# Patient Record
Sex: MALE | Race: WHITE | NOT HISPANIC OR LATINO | Employment: FULL TIME | URBAN - METROPOLITAN AREA
[De-identification: names, ages, dates, MRNs, and addresses within clinical notes are randomized per-mention and may not be internally consistent; named-entity substitution may affect disease eponyms.]

---

## 2020-08-13 ENCOUNTER — OFFICE VISIT (OUTPATIENT)
Dept: NEUROSURGERY | Facility: CLINIC | Age: 40
End: 2020-08-13
Attending: PHYSICIAN ASSISTANT
Payer: COMMERCIAL

## 2020-08-13 VITALS
HEART RATE: 92 BPM | HEIGHT: 73 IN | DIASTOLIC BLOOD PRESSURE: 97 MMHG | SYSTOLIC BLOOD PRESSURE: 140 MMHG | OXYGEN SATURATION: 100 % | TEMPERATURE: 98.1 F | BODY MASS INDEX: 25.82 KG/M2 | WEIGHT: 194.8 LBS

## 2020-08-13 DIAGNOSIS — M54.50 CHRONIC BILATERAL LOW BACK PAIN, UNSPECIFIED WHETHER SCIATICA PRESENT: Primary | ICD-10-CM

## 2020-08-13 DIAGNOSIS — G89.29 CHRONIC BILATERAL LOW BACK PAIN, UNSPECIFIED WHETHER SCIATICA PRESENT: Primary | ICD-10-CM

## 2020-08-13 PROCEDURE — 99203 OFFICE O/P NEW LOW 30 MIN: CPT | Performed by: PHYSICIAN ASSISTANT

## 2020-08-13 PROCEDURE — G0463 HOSPITAL OUTPT CLINIC VISIT: HCPCS

## 2020-08-13 SDOH — HEALTH STABILITY: MENTAL HEALTH: HOW MANY STANDARD DRINKS CONTAINING ALCOHOL DO YOU HAVE ON A TYPICAL DAY?: 1 OR 2

## 2020-08-13 SDOH — HEALTH STABILITY: MENTAL HEALTH: HOW OFTEN DO YOU HAVE 6 OR MORE DRINKS ON ONE OCCASION?: LESS THAN MONTHLY

## 2020-08-13 SDOH — HEALTH STABILITY: MENTAL HEALTH: HOW OFTEN DO YOU HAVE A DRINK CONTAINING ALCOHOL?: 2-3 TIMES A WEEK

## 2020-08-13 ASSESSMENT — MIFFLIN-ST. JEOR: SCORE: 1847.49

## 2020-08-13 ASSESSMENT — PAIN SCALES - GENERAL: PAINLEVEL: MILD PAIN (3)

## 2020-08-13 NOTE — PROGRESS NOTES
Neurosurgery Consult    HPI    Mr. Reese is a 40-year-old male who is self-referred for evaluation of low back pain.  He states he had a history of low back pain for the past several years it is been waxing and waning it had improved initially after he got a standing desk at his work and then when he had to work from home he initially did not have a standing desk and his back pain worsened with increased sitting.  He did obtain a standing desk at home and his back pain got better.  Now he has a start of low level of chronic low backache.  He does report history of occasional flareups and spasm the last few days and generally go away on their own.  Denies any bowel or bladder symptoms, saddle anesthesia, numbness weakness or radicular symptoms into his lower extreme extremities.  He has never had any physical therapy formally, or injections.  He is over-the-counter medications for treatment.    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  Do not feel that he needs any imaging at this point as he has no red flag symptoms.  If his symptoms not improve after 4 to 6 weeks of physical therapy with concern begin with a lumbar x-ray and consider lumbar MRI after that depending on what we find.    Total time of 30 minutes spent with the patient today greater than 50% spent face to face in counseling and coordination of care.

## 2020-08-13 NOTE — NURSING NOTE
"Moshe Reese is a 40 year old male who presents for:  Chief Complaint   Patient presents with     Neurologic Problem     chronic low back Pain        Initial Vitals:  BP (!) 154/111 (BP Location: Right arm, Patient Position: Sitting, Cuff Size: Adult Regular)   Pulse 92   Temp 98.1  F (36.7  C) (Oral)   Ht 6' 1\" (1.854 m)   Wt 194 lb 12.8 oz (88.4 kg)   SpO2 100%   BMI 25.70 kg/m   Estimated body mass index is 25.7 kg/m  as calculated from the following:    Height as of this encounter: 6' 1\" (1.854 m).    Weight as of this encounter: 194 lb 12.8 oz (88.4 kg).. Body surface area is 2.13 meters squared. BP completed using cuff size: large  Mild Pain (3)    Nursing Comments: see chief complaint    Malick Fraire, [facility]    "

## 2020-08-13 NOTE — LETTER
8/13/2020         RE: Moshe Reese  [address] [facility] [place] [address]        Dear Colleague,    Thank you for referring your patient, Moshe Reese, to the [facility]. Please see a copy of my visit note below.    Neurosurgery Consult    HPI    Mr. Reese is a 40-year-old male who is self-referred for evaluation of low back pain.  He states he had a history of low back pain for the past several years it is been waxing and waning it had improved initially after he got a standing desk at his work and then when he had to work from home he initially did not have a standing desk and his back pain worsened with increased sitting.  He did obtain a standing desk at home and his back pain got better.  Now he has a start of low level of chronic low backache.  He does report history of occasional flareups and spasm the last few days and generally go away on their own.  Denies any bowel or bladder symptoms, saddle anesthesia, numbness weakness or radicular symptoms into his lower extreme extremities.  He has never had any physical therapy formally, or injections.  He is over-the-counter medications for treatment.    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  Do not feel that he needs any imaging at this point as he has no red flag symptoms.  If his symptoms not improve after 4 to 6 weeks of physical therapy with  concern begin with a lumbar x-ray and consider lumbar MRI after that depending on what we find.    Total time of 30 minutes spent with the patient today greater than 50% spent face to face in counseling and coordination of care.    Again, thank you for allowing me to participate in the care of your patient.        Sincerely,        Kandice Michelle PA-C

## 2020-08-20 ENCOUNTER — THERAPY VISIT (OUTPATIENT)
Dept: PHYSICAL THERAPY | Facility: CLINIC | Age: 40
End: 2020-08-20
Payer: COMMERCIAL

## 2020-08-20 DIAGNOSIS — G89.29 CHRONIC BILATERAL LOW BACK PAIN, UNSPECIFIED WHETHER SCIATICA PRESENT: ICD-10-CM

## 2020-08-20 DIAGNOSIS — M54.50 CHRONIC BILATERAL LOW BACK PAIN, UNSPECIFIED WHETHER SCIATICA PRESENT: ICD-10-CM

## 2020-08-20 PROCEDURE — 97112 NEUROMUSCULAR REEDUCATION: CPT | Mod: GP | Performed by: PHYSICAL THERAPIST

## 2020-08-20 PROCEDURE — 97162 PT EVAL MOD COMPLEX 30 MIN: CPT | Mod: GP | Performed by: PHYSICAL THERAPIST

## 2020-08-20 NOTE — PROGRESS NOTES
"[facility]: Physical Therapy Initial Evaluation   Aug 20, 2020  Subjective:   Chief Complaint: bilateral low back pain   Pain: bilateral low back pain radiating out to the flanks   Numbness/Tingling: None   Weakness: None   Stiffness: in the low back  New/Recurrent/Chronic: Recurrent  DOI/onset: March 2020  Referral Date: 8/13/2020 - Kandice Michelle PA-C  Mechanism of onset: started working at home, no more access to a standing desk. Trigger when he \"threw it out\" rough-housing with his girls  PMH/surgical history/trauma: No significant medical history  General health as reported by patient: Good  Medications: Omeprazole  Occupation: Banker Job duties: computer work,   Previous Treatment (Effect): Chiropractic (minimal relief) ; core strengthening ; stretching (maybe helping) ; ice/heat (both moderate and temporary relief) ;   Imaging: No imaging done. Will consider imaging if not making progress with PT.   AM/PM: wakes up with symptoms, improved with stretching and exercise, then gets worse later on  Quality of Pain: stiffness, soreness,   Pain: 2/10 at present, 1/10 at best, 5/10 at worst  Worse: bending, dressing, sitting, walking up hills sometimes  Better: stretching, walking  Progression of Symptoms since onset: better    Current Functional Status:   - standing - fatigue/soreness within 30-60 mintues   - sitting - sore with sitting 5-10 minutes  Current HEP/exercise regimen: walk/hikes, core strengthening/yoga/stretch, golfing,   Transportation to Therapy: Independent with transportation  Live with Others: Lives with 2 daughter (5 and 7 years old), 1 cat  Red Flags:   - Patient denies the following: Pain with Cough / Sneeze / Laughing ; Fever ; Weakness ; Numbness/Tingling ; Change in Bowel or Bladder ;     Patient's goal(s): I would like to successfully eliminate the pain.       Objective:    Posture: sitting posture poor, mild inc in lumbar lordosis, bilateral pes planus    Gait: " "mild right compensated trendelenberg    Lumbar AROM: (Major, Moderate, Minimal or Nil loss)  Movement Loss Thomas Mod Min Nil Comments   Flexion  x      Extension   x  \"Kind of feels good\"   Side Gliding L   x x Mild stiffness   Side Gliding R   x x Mild stiffness     Dural Signs:   R L   Slump (-) (-)     Other:   - TA activation: Able to activate transverse abdominus, but with difficulty.      Repeated movement testing:   (During: produces, abolishes, increases, decreases, no effect, centralizing, peripheralizing; After: better, worse, no better, no worse, no effect, centralized, peripheralized)    Pre-test Symptoms Lying: mild tightness/soreness    Symptoms During Symptoms After ROM increased ROM decreased No Effect   VIANNEY        Rep VIANNEY        Prone Lying        JEWELL        Rep EIL same Better same       Lumbar Mobility/Spring Testing: WNL, some relief with PA pressure to the sacrum    Other:   - Patient was unable to perform a posterior pelvic tilt. When cued into a posterior pelvic tilt, the lumbar extensors would contract.   - Slight progress towards a posteiror pelvic tilt by starting in Double Knee to Chest and lowering one leg at a time while trying to hold.       Therapist Impression/Differential Diagnosis:   Patient presents with low back after a transition to working at home where he no longer has access to a sit to stand desk. He exhibits extreme difficulty in performing a posterior pelvic tilt and is suspected to have poor control/activation of the stabilizer muscles of the spine, with high suspicion for compensatory stabilization coming from the paraspinals. Our therapy will focus--initially--on improving control and activation of the stabilizers and of the muscles that perform more fine control and movement of the core/pelvis.       Assessment/Plan:    Patient is a 40 year old male with lumbar complaints.    Patient has the following significant findings with corresponding treatment plan.              "   Referring Diagnosis: Chronic bilateral low back pain, unspecified whether sciatica present     Pain -  hot/cold therapy, manual therapy, splint/taping/bracing/orthotics, self management, education, directional preference exercise and home program  Decreased ROM/flexibility - manual therapy, therapeutic exercise, therapeutic activity and home program  Impaired gait - gait training and home program  Impaired muscle performance - neuro re-education and home program  Decreased function - therapeutic activities and home program  Impaired posture - neuro re-education, therapeutic activities and home program          Therapy Evaluation Codes:   1) History comprised of:   Personal factors that impact the plan of care:      Living environment and Profession.    Comorbidity factors that impact the plan of care are:      None.     Medications impacting care: None.  2) Examination of Body Systems comprised of:   Body structures and functions that impact the plan of care:      Lumbar spine and Pelvis.   Activity limitations that impact the plan of care are:      Bending, Sitting and Standing.  3) Clinical presentation characteristics are:   Evolving/Changing.  4) Decision-Making    Moderate complexity using standardized patient assessment instrument and/or measureable assessment of functional outcome.  Cumulative Therapy Evaluation is: Moderate complexity.    Previous and current functional limitations:  (See Goal Flow Sheet for this information)    Short term and Long term goals: (See Goal Flow Sheet for this information)     Communication ability:  Patient appears to be able to clearly communicate and understand verbal and written communication and follow directions correctly.  Treatment Explanation - The following has been discussed with the patient:   RX ordered/plan of care  Anticipated outcomes  Possible risks and side effects  This patient would benefit from PT intervention to resume normal activities.   Rehab potential  is good.    Frequency:  1 X week, once daily  Duration:  for 8 weeks  Discharge Plan:  Achieve all LTG.  Independent in home treatment program.  Reach maximal therapeutic benefit.    Please refer to the daily flowsheet for treatment today, total treatment time and time spent performing 1:1 timed codes.

## 2020-08-20 NOTE — PROGRESS NOTES
[facility]: Physical Therapy Initial Evaluation   Aug 20, 2020  Comments/Precautions/Restrictions/Physician instructions:   Per Patient: ***  Per Orders: ***    Subjective:   Chief Complaint: ***   Pain: ***   Numbness/Tingling: ***   Weakness: ***   Stiffness: ***   Other: ***  New/Recurrent/Chronic: ***  DOI/onset: ***   DOS: ***  Referral Date: 8/13/2020 - Kandice Michelle PA-C  Mechanism of onset: ***  PMH/surgical history/trauma: ***   - ***   - ***   - ***  General health as reported by patient: Good Excellent *** Fair Poor  Medications: Anti-depressants, Anti-inflammatory, Anti-seizure, Bone density, Cardiac, Anticoagulants, HTN, Hormone Replacement, Muscle Relaxants, Pain, Sleep, Steroids,  Thyroid, ***  Occupation: *** Job duties: computer work, lifting/carrying, prolonged sitting, pushing/pulling, driving, operating a machine/assembly, prolonged standing, repetitive tasks, ***  Previous Treatment (Effect): ***  Imaging: No imaging done. Will consider imaging if not making progress with PT.   AM/PM: ***  Quality of Pain: ***  Pain: ***/10 at present, ***/10 at best, ***/10 at worst  Worse: ***  Better: ***  Progression of Symptoms since onset: ***   Hx of Falls: ***  Sleeping: ***   Current Functional Status:   - ***   - ***  - ***  Previous Functional Status: ***  Current HEP/exercise regimen: ***  Hand/Leg Dominance: ***  Transportation to Therapy: Independent with transportation ***  Live with Others: ***  Personal Factors: ***  Red Flags:   - Patient denies the following: Locking, Catching (knee); Pain with Cough / Sneeze / Laughing ; Night Pain ; Fever ; Weakness ; Numbness/Tingling ; Saddle Anesthesia ; Change in Bowel or Bladder ; Chest Pain ; Unexplained Weight Loss ; Rashes or Lesions of the Skin ; Non-Healing Wounds ; Edema of the Feet ; Vision Change ; Calf Pain/Swelling/Warmth  - Patient reports the following:    Patient's goal(s): ***          PTRx Content from today's  "visit:  ***        Therapist Impression/Differential Diagnosis:   ***      Assessment/Plan:    Patient is a 40 year old male with {AREA:945317} complaints.    Patient has the following significant findings with corresponding treatment plan.                Referring Diagnosis: Chronic bilateral low back pain, unspecified whether sciatica present     {REHAB LIMITATIONS/TREATMENT PLAN:797473}          Therapy Evaluation Codes:   1) History comprised of:   Personal factors that impact the plan of care:      {Personal factors impacting care:871970}.    Comorbidity factors that impact the plan of care are:      {Comorbidities impacting care:816773}.     Medications impacting care: {Medications Impacting care:637569}.  2) Examination of Body Systems comprised of:   Body structures and functions that impact the plan of care:      {Body Structures and functions:203217}.   Activity limitations that impact the plan of care are:      {Activity/participation limitations or restrictions:704891}.  3) Clinical presentation characteristics are:   {Clinical presentation characteristics:867831}.  4) Decision-Making    {Decision Making Low/Moderate/High:513590}  Cumulative Therapy Evaluation is: {Low/Moderate/High/Re-evaluation complexity:552904}.    Previous and current functional limitations:  (See Goal Flow Sheet for this information)    Short term and Long term goals: (See Goal Flow Sheet for this information)     Communication ability:  {COMMUNICATION ABILITY:342219::\"Patient appears to be able to clearly communicate and understand verbal and written communication and follow directions correctly.\"}  Treatment Explanation - The following has been discussed with the patient:   {RX EXPLANATION:593578::\"RX ordered/plan of care\",\"Anticipated outcomes\",\"Possible risks and side effects\"}  {RX PLANNED rehab:824697::\"This patient would benefit from PT intervention to resume normal activities.\"}   Rehab potential is {REHAB " "POTENTIAL/PROGNOSIS:571967}.    Frequency:  {FREQUENCY OF TREATMENT:680631}  Duration:  {DURATION OF TREATMENT:388354}  Discharge Plan:  {Discharge Plan:427570::\"Achieve all LTG.\",\"Independent in home treatment program.\",\"Reach maximal therapeutic benefit.\"}    Please refer to the daily flowsheet for treatment today, total treatment time and time spent performing 1:1 timed codes.           "

## 2020-08-26 ENCOUNTER — THERAPY VISIT (OUTPATIENT)
Dept: PHYSICAL THERAPY | Facility: CLINIC | Age: 40
End: 2020-08-26
Payer: COMMERCIAL

## 2020-08-26 DIAGNOSIS — M54.50 CHRONIC BILATERAL LOW BACK PAIN, UNSPECIFIED WHETHER SCIATICA PRESENT: ICD-10-CM

## 2020-08-26 DIAGNOSIS — G89.29 CHRONIC BILATERAL LOW BACK PAIN, UNSPECIFIED WHETHER SCIATICA PRESENT: ICD-10-CM

## 2020-08-26 PROCEDURE — 97112 NEUROMUSCULAR REEDUCATION: CPT | Mod: GP | Performed by: PHYSICAL THERAPIST

## 2020-08-26 PROCEDURE — 97110 THERAPEUTIC EXERCISES: CPT | Mod: GP | Performed by: PHYSICAL THERAPIST

## 2020-08-26 NOTE — PROGRESS NOTES
DISCHARGE  REPORT    Progress reporting period is from Aug 20, 2020 to Aug 26, 2020.         SUBJECTIVE  Subjective changes noted by patient: Virtually painfree. Has been standing for work at home. Knows he needs to be more careful with the girls. Exercises have been going well, no questions.    Current Pain level: 0/10.     Initial Pain level: 5/10.   Changes in function:  Yes (See Goal flowsheet attached for changes in current functional level)  Adverse reaction to treatment or activity: None    OBJECTIVE  Changes noted in objective findings:  Yes, patient demonstrates greatly improved control with pelvic tilt movements      ASSESSMENT/PLAN  Updated problem list and treatment plan: Diagnosis 1:  Chronic bilateral low back pain, unspecified whether sciatica present    STG/LTGs have been met or progress has been made towards goals:  Yes (See Goal flow sheet completed today.)  Assessment of Progress: The patient has met all of their long term goals.  Self Management Plans:  Patient is independent in a home treatment program.  Patient is independent in self management of symptoms.  I have re-evaluated this patient and find that the nature, scope, duration and intensity of the therapy is appropriate for the medical condition of the patient.  Moshe continues to require the following intervention to meet STG and LTG's:  PT intervention is no longer required to meet STG/LTG.    Recommendations:  This patient is ready to be discharged from therapy and continue their home treatment program.    Please refer to the daily flowsheet for treatment today, total treatment time and time spent performing 1:1 timed codes.

## 2022-02-21 ENCOUNTER — THERAPY VISIT (OUTPATIENT)
Dept: PHYSICAL THERAPY | Facility: CLINIC | Age: 42
End: 2022-02-21
Payer: COMMERCIAL

## 2022-02-21 DIAGNOSIS — M54.50 CHRONIC BILATERAL LOW BACK PAIN WITHOUT SCIATICA: Primary | ICD-10-CM

## 2022-02-21 DIAGNOSIS — G89.29 CHRONIC BILATERAL LOW BACK PAIN WITHOUT SCIATICA: Primary | ICD-10-CM

## 2022-02-21 PROCEDURE — 97110 THERAPEUTIC EXERCISES: CPT | Mod: GP | Performed by: PHYSICAL THERAPIST

## 2022-02-21 PROCEDURE — 97161 PT EVAL LOW COMPLEX 20 MIN: CPT | Mod: GP | Performed by: PHYSICAL THERAPIST

## 2022-02-21 NOTE — PROGRESS NOTES
[facility]: Physical Therapy Initial Evaluation   Feb 21, 2022    Subjective:   Chief Complaint: Low Back Pain   Pain: bilateral low back pain, right a little worse than the left yesterday, but generally pretty even   Numbness/Tingling: None   Weakness: with bending over, squatting   Stiffness: in the low back  New/Recurrent/Chronic: Chronic-Recurrent  DOI/onset: a long time ago   Referral Date: self-referred today  Mechanism of onset: unknown, long standing  PMH/surgical history/trauma: No significant medical history  General health as reported by patient: Good  Medications: Omeprazole,   Occupation: Banker Job duties: computer work,   Previous Treatment (Effect): Chiropractic (minimal relief) ; core strengthening ; stretching (maybe helping) ; ice/heat (both moderate and temporary relief) ; PT (helpful, had a hard time knowing if he was doing it correctly)   Imaging: no recent imaging  AM/PM: most stiff in the morning, gets looser from there, but never gone  Quality of Pain: stiffness and mild soreness  Pain: 1/10 at present, 3/10 at worst  Worse: prolonged sitting, prolonged standing  Better: stretch,   Progression of Symptoms since onset: worse again recently   Current Functional Status: SEE GOALS FLOWSHEET  - Sleeping: can feel it when moving around/trying to fall asleep    - sitting - increased symptoms after 20-30 minutes  - standing - increased symptoms after 20-30 minutes  - lifting - any lifting that requires his legs causes increased pain  - golf - has not been able to play golf because of back pain.   Current HEP/exercise regimen: walk/hikes, core strengthening/yoga/stretch, golfing,   Transportation to Therapy: Independent with transportation  Live with Others: Lives with 2 daughter (8 and 7 years old), 1 cat  Red Flags:   - Patient denies the following: Night Pain ; Fever ; Numbness/Tingling ; Change in Bowel or Bladder ;  - Patient reports the following: Pain with Cough / Sneeze /  Laughing ;    Patient's goal(s): Feel confident in knowing what to do going forward to do the exercises correctly and get better.         Objective:    Posture: elevated left shoulder compared to the right     Gait: increased pelvic rotation    Functional:  - Squat: increased lumbar flexion    Lumbar AROM: (Major, Moderate, Minimal or Nil loss)  Movement Loss Thomas Mod Min Nil Comments   Flexion x x      Extension  x x  Feels good   Sidebending L   x  cc   Sidebenging R  x   cc       Repeated movement testing:   (During: produces, abolishes, increases, decreases, no effect, centralizing, peripheralizing; After: better, worse, no better, no worse, no effect, centralized, peripheralized)     Symptoms During Symptoms After ROM increased ROM decreased No Effect   VIANNEY        Rep VIANNEY        Prone Lying better better      JEWELL abolished same      Rep EIL same same   x   Rep EIL w/ OP same same   x     Lumbar Mobility/Spring Testing: stiffness of the lumbar spine.     Palpation: tightness in the lumbar paraspinals of the middle to lower lumbar region.     Other:   - Ely's positive bilaterally            Therapist Impression/Differential Diagnosis:   Moshe presents to physical therapy with a primary complaint of low back pain with suspect weakness of muscles critical to effective dynamic stabilization. Clinical findings include increased tension of the lumbar paraspinals, stiffness of the lumbar spine, impaired active range of motion in the lumbar spine, and abnormal gait. Rehabilitation will establish the foundational strength to then advance into lumbar stabilization exercises.       Assessment/Plan:    Patient is a 41 year old male with lumbar complaints.    Patient has the following significant findings with corresponding treatment plan.                PT Diagnosis: low back pain with stiffness impaired dynamic stability    Pain -  hot/cold therapy, manual therapy, splint/taping/bracing/orthotics, self management,  education, directional preference exercise and home program  Decreased ROM/flexibility - manual therapy, therapeutic exercise, therapeutic activity and home program  Decreased joint mobility - manual therapy, therapeutic exercise, therapeutic activity and home program  Decreased strength - therapeutic exercise, therapeutic activities and home program  Impaired gait - gait training and home program  Decreased function - therapeutic activities and home program      Therapy Evaluation Codes:   1) History comprised of:   Personal factors that impact the plan of care:      Coping style, Living environment, Overall behavior pattern and Past/current experiences.    Comorbidity factors that impact the plan of care are:      None.     Medications impacting care: None.  2) Examination of Body Systems comprised of:   Body structures and functions that impact the plan of care:      Lumbar spine and Pelvis.   Activity limitations that impact the plan of care are:      Lifting, Sitting, Sports and Standing.  3) Clinical presentation characteristics are:   Evolving/Changing.  4) Decision-Making    Low complexity using standardized patient assessment instrument and/or measureable assessment of functional outcome.  Cumulative Therapy Evaluation is: Low complexity.    Previous and current functional limitations:  (See Goal Flow Sheet for this information)    Short term and Long term goals: (See Goal Flow Sheet for this information)     Communication ability:  Patient appears to be able to clearly communicate and understand verbal and written communication and follow directions correctly.  Treatment Explanation - The following has been discussed with the patient:   RX ordered/plan of care  Anticipated outcomes  Possible risks and side effects  This patient would benefit from PT intervention to resume normal activities.   Rehab potential is fair.    Frequency:  1 X week, once daily  Duration:  for 8 weeks  Discharge Plan:  Achieve all  LTG.  Independent in home treatment program.  Reach maximal therapeutic benefit.    Please refer to the daily flowsheet for treatment today, total treatment time and time spent performing 1:1 timed codes.

## 2022-03-01 ENCOUNTER — THERAPY VISIT (OUTPATIENT)
Dept: PHYSICAL THERAPY | Facility: CLINIC | Age: 42
End: 2022-03-01
Payer: COMMERCIAL

## 2022-03-01 DIAGNOSIS — G89.29 CHRONIC BILATERAL LOW BACK PAIN WITHOUT SCIATICA: ICD-10-CM

## 2022-03-01 DIAGNOSIS — M54.50 CHRONIC BILATERAL LOW BACK PAIN WITHOUT SCIATICA: ICD-10-CM

## 2022-03-01 PROCEDURE — 97140 MANUAL THERAPY 1/> REGIONS: CPT | Mod: GP | Performed by: PHYSICAL THERAPIST

## 2022-03-01 PROCEDURE — 97112 NEUROMUSCULAR REEDUCATION: CPT | Mod: GP | Performed by: PHYSICAL THERAPIST

## 2022-03-08 ENCOUNTER — THERAPY VISIT (OUTPATIENT)
Dept: PHYSICAL THERAPY | Facility: CLINIC | Age: 42
End: 2022-03-08
Payer: COMMERCIAL

## 2022-03-08 DIAGNOSIS — G89.29 CHRONIC BILATERAL LOW BACK PAIN WITHOUT SCIATICA: ICD-10-CM

## 2022-03-08 DIAGNOSIS — M54.50 CHRONIC BILATERAL LOW BACK PAIN WITHOUT SCIATICA: ICD-10-CM

## 2022-03-08 PROCEDURE — 97110 THERAPEUTIC EXERCISES: CPT | Mod: GP | Performed by: PHYSICAL THERAPIST

## 2022-03-15 ENCOUNTER — THERAPY VISIT (OUTPATIENT)
Dept: PHYSICAL THERAPY | Facility: CLINIC | Age: 42
End: 2022-03-15
Payer: COMMERCIAL

## 2022-03-15 DIAGNOSIS — M54.50 CHRONIC BILATERAL LOW BACK PAIN WITHOUT SCIATICA: ICD-10-CM

## 2022-03-15 DIAGNOSIS — G89.29 CHRONIC BILATERAL LOW BACK PAIN WITHOUT SCIATICA: ICD-10-CM

## 2022-03-15 PROCEDURE — 97110 THERAPEUTIC EXERCISES: CPT | Mod: GP | Performed by: PHYSICAL THERAPIST

## 2022-03-15 PROCEDURE — 97530 THERAPEUTIC ACTIVITIES: CPT | Mod: GP | Performed by: PHYSICAL THERAPIST

## 2022-03-15 PROCEDURE — 97112 NEUROMUSCULAR REEDUCATION: CPT | Mod: GP | Performed by: PHYSICAL THERAPIST

## 2022-04-06 ENCOUNTER — THERAPY VISIT (OUTPATIENT)
Dept: PHYSICAL THERAPY | Facility: CLINIC | Age: 42
End: 2022-04-06
Payer: COMMERCIAL

## 2022-04-06 DIAGNOSIS — G89.29 CHRONIC BILATERAL LOW BACK PAIN WITHOUT SCIATICA: ICD-10-CM

## 2022-04-06 DIAGNOSIS — M54.50 CHRONIC BILATERAL LOW BACK PAIN WITHOUT SCIATICA: ICD-10-CM

## 2022-04-06 PROCEDURE — 97112 NEUROMUSCULAR REEDUCATION: CPT | Mod: GP | Performed by: PHYSICAL THERAPIST

## 2022-04-06 PROCEDURE — 97530 THERAPEUTIC ACTIVITIES: CPT | Mod: GP | Performed by: PHYSICAL THERAPIST

## 2022-04-06 NOTE — PROGRESS NOTES
DISCHARGE REPORT    Progress reporting period is from Feb 21, 2022 to Apr 6, 2022.       SUBJECTIVE  Subjective changes noted by patient:  Back is pretty good. The initial pain with bending forward is gone. It's not nothing, it just not painful. Mostly presents as fatigue or discomfort with things like standing for longer periods of time. .       Current pain level is 0/10  .     Initial Pain level: 3/10.   Changes in function:  Yes (See Goal flowsheet attached for changes in current functional level)  Adverse reaction to treatment or activity: None    OBJECTIVE  Changes noted in objective findings:  Yes, Patient demonstrates improving--but still hindered--ability to perform pelvic tilts. He is able to swing a golf club at less than full speed and his form looks such that there is little suspicion as to his ability to swing full speed. His trunk control is greatly improving and continuing to improve.         ASSESSMENT/PLAN  Updated problem list and treatment plan: Diagnosis 1:  low back pain with stiffness impaired dynamic stability     Decreased function - therapeutic activities and home program      STG/LTGs have been met or progress has been made towards goals:  Yes (See Goal flow sheet completed today.)  Assessment of Progress: The patient's condition is improving.  The patient's condition has potential to improve.  Patient is meeting short term goals and is progressing towards long term goals.  Self Management Plans:  Patient is independent in a home treatment program.  Patient is independent in self management of symptoms.  I have re-evaluated this patient and find that the nature, scope, duration and intensity of the therapy is appropriate for the medical condition of the patient.  Moshe continues to require the following intervention to meet STG and LTG's:  PT intervention is no longer required to meet STG/LTG.    Recommendations:  This patient is ready to be discharged from therapy and continue their home  treatment program.  He has not yet made a full return to recreational activities (gofl). He has been instructed to return if he begins to play golf again and finds he is having issues.     Please refer to the daily flowsheet for treatment today, total treatment time and time spent performing 1:1 timed codes.

## 2022-05-01 ENCOUNTER — HEALTH MAINTENANCE LETTER (OUTPATIENT)
Age: 42
End: 2022-05-01

## 2022-05-10 PROBLEM — M54.50 CHRONIC BILATERAL LOW BACK PAIN WITHOUT SCIATICA: Status: RESOLVED | Noted: 2020-08-20 | Resolved: 2022-05-10

## 2022-05-10 PROBLEM — G89.29 CHRONIC BILATERAL LOW BACK PAIN WITHOUT SCIATICA: Status: RESOLVED | Noted: 2020-08-20 | Resolved: 2022-05-10

## 2022-11-21 ENCOUNTER — HEALTH MAINTENANCE LETTER (OUTPATIENT)
Age: 42
End: 2022-11-21

## 2023-01-01 ENCOUNTER — HOSPITAL ENCOUNTER (EMERGENCY)
Facility: CLINIC | Age: 43
Discharge: HOME | End: 2023-01-01
Attending: EMERGENCY MEDICINE | Admitting: EMERGENCY MEDICINE
Payer: COMMERCIAL

## 2023-01-01 VITALS
SYSTOLIC BLOOD PRESSURE: 149 MMHG | TEMPERATURE: 98.1 F | OXYGEN SATURATION: 98 % | HEART RATE: 74 BPM | RESPIRATION RATE: 16 BRPM | WEIGHT: 191.14 LBS | DIASTOLIC BLOOD PRESSURE: 108 MMHG | HEIGHT: 73 IN | BODY MASS INDEX: 25.33 KG/M2

## 2023-01-01 DIAGNOSIS — K61.0 PERIANAL ABSCESS: ICD-10-CM

## 2023-01-01 PROCEDURE — 99284 EMERGENCY DEPT VISIT MOD MDM: CPT | Mod: 25

## 2023-01-01 PROCEDURE — 46050 I&D PERIANAL ABSCESS SUPFC: CPT

## 2023-01-01 PROCEDURE — 250N000009 HC RX 250

## 2023-01-01 PROCEDURE — 76775 US EXAM ABDO BACK WALL LIM: CPT

## 2023-01-01 RX ORDER — DOXYCYCLINE 100 MG/1
100 CAPSULE ORAL 2 TIMES DAILY
Qty: 14 CAPSULE | Refills: 0 | Status: SHIPPED | OUTPATIENT
Start: 2023-01-01 | End: 2023-01-10

## 2023-01-01 RX ORDER — LIDOCAINE 40 MG/G
CREAM TOPICAL ONCE
Status: COMPLETED | OUTPATIENT
Start: 2023-01-01 | End: 2023-01-01

## 2023-01-01 RX ORDER — LIDOCAINE 40 MG/G
CREAM TOPICAL
Status: COMPLETED
Start: 2023-01-01 | End: 2023-01-01

## 2023-01-01 RX ORDER — DOXYCYCLINE 100 MG/1
100 CAPSULE ORAL 2 TIMES DAILY
Qty: 14 CAPSULE | Refills: 0 | Status: SHIPPED | OUTPATIENT
Start: 2023-01-01 | End: 2023-01-01

## 2023-01-01 RX ADMIN — LIDOCAINE: 40 CREAM TOPICAL at 13:04

## 2023-01-01 ASSESSMENT — ACTIVITIES OF DAILY LIVING (ADL): ADLS_ACUITY_SCORE: 33

## 2023-01-01 NOTE — ED TRIAGE NOTES
"Pt arrives to the ED due to having a bump near his rectum. Pt states \"I feel like I have an internal hemorrhoids\". States it began on Christmas day. Increased swelling and pain. Denies any bleeding.       "

## 2023-01-01 NOTE — ED PROVIDER NOTES
"  History     Chief Complaint:  Rectal/perineal Pain       HPI   Moshe Reese is a 42 year old male who presents with rectal/perineal pain.  Patient noted onset of symptoms around [place] time.  Since then, pain has worsened.  Denies any rectal bleeding or pain with BMs.  No fevers or chills.  No history of pain similar to this in the past.  No abdominal pain.  No vomiting.  Has tried warm baths, which has helped as well as suppositories, providing minimal relief.    Independent Historian: Yes     Review of External Notes: NA     ROS:  Review of Systems  10 point ROS negative aside from that mentioned in HPI    Allergies:  No Known Allergies     Medications:    doxycycline hyclate (VIBRAMYCIN) 100 MG capsule  omeprazole (PRILOSEC) 20 MG DR capsule      Past Medical History:    Otherwise healthy    Past Surgical History:    No past surgical history on file.     Family History:    family history is not on file.    Social History:   reports that he has never smoked. He has never used smokeless tobacco. He reports current alcohol use. He reports that he does not use drugs.  PCP: No Ref-Primary, Physician     Physical Exam     Patient Vitals for the past 24 hrs:   BP Temp Temp src Pulse Resp SpO2 Height Weight   01/01/23 1441 -- -- -- 74 16 98 % -- --   01/01/23 1109 (!) 149/108 98.1  F (36.7  C) Oral 95 18 98 % 1.854 m (6' 1\") 86.7 kg (191 lb 2.2 oz)        Physical Exam  Genitourinary:          General:   Well-nourished   Speaking in full sentences  Eyes:   Conjunctiva without injection or scleral icterus  ENT:   Moist mucous membranes   Nares patent   Pinnae normal  Neck:   Full ROM   No stiffness appreciated  Resp:   Lungs CTAB   No crackles, wheezing or audible rubs   Good air movement  CV:    Normal rate, regular rhythm   S1 and S2 present   No murmur, gallop or rub  GI:   BS present   Abdomen soft without distention   Non-tender to light and deep palpation   No guarding or rebound tenderness  :   (With " nurse chaperone present)   Fluctuance and swelling in gluteal cleft posterior to anal verge   Mild overlying erythema   No tenderness to palpation about the rectum or just internal to rectum   No open wounds or spontaneous drainage  Skin:   Rectal region as noted above  MSK:   Moves all extremities   No focal deformities or swelling  Neuro:   Alert   Answers questions appropriately   Moves all extremities equally   Gait stable  Psych:   Normal affect, normal mood    Emergency Department Course     Imaging:  POC US SOFT TISSUE   Final Result     Soft Tissue Ultrasound      Procedure Name: POC Ultrasound Soft Tissue Exam      Indication: Swelling      Views: Skin and Subcutaneous tissue location      Findings: Fluid collection and some cobblestoning      Impression: No evidence of abscess and superficial cellulitis      Study performed by: Moshe Guadalupe MD       Images archived: No               Procedures     Incision and Drainage     Procedure: Incision and Drainage     Consent: Verbal    Indication: Abscess    Location: Anogenital    Size: 3 cm    Ultrasound Guidance: Yes, see separate procedural guidance POCUS note     Preparation: Alcohol     Anesthesia/Sedation: Topical -LET and Lidocaine - 1%     Procedure Detail:    Aspiration: Yes  Incision Type: Single straight  Scalpel: 11  Lesion Management: Probed and deloculated   Wound Management: Left open   Packing: None     Patient Status: The patient tolerated the procedure well: Yes. There were no complications.    Emergency Department Course & Assessments:    Interventions:  LMX  Lidocaine 1% local    Independent Interpretation (X-rays, CTs, rhythm strip):  Bedside US confirms fluid collection consistent with abscess    Consultations/Discussion of Management or Tests:  None     Social Determinants of Health affecting care:  None     Disposition:  The patient was discharged to home.     Impression & Plan      Medical Decision Making:  Moshe Reese is a 42  year old male who presents to the emergency department for evaluation of a skin infection / wound.  VS on presentation reveal elevated BP, though are otherwise unremarkable.  Differential diagnosis is includes cutaneous abscess, cellulitis, necrotizing fasciitis, aneurysm, cyst, hematoma, among others.  Based on the above history and examination, findings are most suspicious for cutaneous perianal abscess.  Bedside US was used (see above) to evaluate subcutaneous tissues, and revealed subcutaneous fluid collection amenable to incision and drainage.  After informed verbal consent obtained, procedure performed as noted above.  After adequate anesthesia was achieved, this was performed as above, which the patient tolerated without difficulty.  Patient does not have risk factors including large abscess size (>5 cm), diabetes mellitus, underlying malignancy, chronic steroid use, immunosuppression, or HIV. For that reason, packing was not left in place.  At this time, there are signs of surrounding cellulitis for which he will be started on doxycycline.  He is not showing signs of sepsis/systemic illness. The abscess was distinctly separate from the rectum, and he did not have tenderness about the rectum/rectal verge, for which I felt further advanced imaging can be deferred.  The incision was also made at the furthest point of fluctuance from the rectum.    I reviewed the recommended treatment plan with the patient.  Patient is felt safe for discharge home and close outpatient follow-up at this time.  Wound care instructions were discussed, as were signs/symptoms suggestive of developing infection.  Patient was encouraged to follow-up with Colon and Rectal Surgery in 24-48 hours for repeat wound check and consideration of packing removal (referral information provided).  They were welcomed to return to the ER with worsening pain, increased drainage, spreading redness, fevers, chills, vomiting, or any other new or  troubling symptoms.  Questions were answered prior to discharge.    Diagnosis:    ICD-10-CM    1. Perianal abscess  K61.0            Discharge Medications:  Discharge Medication List as of 1/1/2023  2:27 PM      START taking these medications    Details   doxycycline hyclate (VIBRAMYCIN) 100 MG capsule Take 1 capsule (100 mg) by mouth 2 times daily, Disp-14 capsule, R-0, Local Print              1/1/2023   Moshe Guadalupe MD Roach, Brian Donald, MD  01/01/23 5752

## 2023-01-01 NOTE — DISCHARGE INSTRUCTIONS
Follow-up:  Please follow-up in 24-48 hours for re-evaluation and discussion of your visit to the emergency department today.  At that time, your wound should be re-evaluated, and packing may be removed.  If there is ongoing pus from the abscess, it may require ongoing backing.    Home treatments:  Recommended home therapies include:    1.  Follow-up as directed is VERY important    2.  Monitor for signs of spreading infection (redness, warmth, drainage of more pus, fever, chills)    New prescriptions:  Doxycycline    Return precautions:  Warning signs which should prompt you to return to the ER include worsening pain, spreading redness, fevers, drainage of pus, or any other new or troubling symptoms.  We are always happy to see you again.

## 2023-01-09 ASSESSMENT — ENCOUNTER SYMPTOMS
HEADACHES: 0
HEMATURIA: 0
NAUSEA: 0
JOINT SWELLING: 0
DYSURIA: 0
ABDOMINAL PAIN: 0
CONSTIPATION: 0
PARESTHESIAS: 0
NERVOUS/ANXIOUS: 0
SORE THROAT: 0
DIZZINESS: 0
FEVER: 0
PALPITATIONS: 0
COUGH: 0
ARTHRALGIAS: 0
DIARRHEA: 0
SHORTNESS OF BREATH: 0
CHILLS: 0
HEARTBURN: 0
HEMATOCHEZIA: 0
EYE PAIN: 0
WEAKNESS: 0
FREQUENCY: 0
MYALGIAS: 0

## 2023-01-10 ENCOUNTER — OFFICE VISIT (OUTPATIENT)
Dept: FAMILY MEDICINE | Facility: CLINIC | Age: 43
End: 2023-01-10
Payer: COMMERCIAL

## 2023-01-10 VITALS
WEIGHT: 193.8 LBS | SYSTOLIC BLOOD PRESSURE: 136 MMHG | RESPIRATION RATE: 16 BRPM | TEMPERATURE: 98 F | DIASTOLIC BLOOD PRESSURE: 84 MMHG | HEART RATE: 82 BPM | HEIGHT: 74 IN | OXYGEN SATURATION: 100 % | BODY MASS INDEX: 24.87 KG/M2

## 2023-01-10 DIAGNOSIS — Z23 ENCOUNTER FOR IMMUNIZATION: ICD-10-CM

## 2023-01-10 DIAGNOSIS — Z11.59 NEED FOR HEPATITIS C SCREENING TEST: ICD-10-CM

## 2023-01-10 DIAGNOSIS — Z13.220 SCREENING FOR HYPERLIPIDEMIA: ICD-10-CM

## 2023-01-10 DIAGNOSIS — Z00.00 ROUTINE GENERAL MEDICAL EXAMINATION AT A HEALTH CARE FACILITY: Primary | ICD-10-CM

## 2023-01-10 DIAGNOSIS — Z23 HIGH PRIORITY FOR 2019-NCOV VACCINE: ICD-10-CM

## 2023-01-10 DIAGNOSIS — Z11.4 SCREENING FOR HIV (HUMAN IMMUNODEFICIENCY VIRUS): ICD-10-CM

## 2023-01-10 LAB
ALBUMIN SERPL BCG-MCNC: 4.8 G/DL (ref 3.5–5.2)
ALP SERPL-CCNC: 73 U/L (ref 40–129)
ALT SERPL W P-5'-P-CCNC: 22 U/L (ref 10–50)
ANION GAP SERPL CALCULATED.3IONS-SCNC: 13 MMOL/L (ref 7–15)
AST SERPL W P-5'-P-CCNC: 29 U/L (ref 10–50)
BILIRUB SERPL-MCNC: 0.7 MG/DL
BUN SERPL-MCNC: 13.1 MG/DL (ref 6–20)
CALCIUM SERPL-MCNC: 10 MG/DL (ref 8.6–10)
CHLORIDE SERPL-SCNC: 102 MMOL/L (ref 98–107)
CHOLEST SERPL-MCNC: 249 MG/DL
CREAT SERPL-MCNC: 0.85 MG/DL (ref 0.67–1.17)
DEPRECATED HCO3 PLAS-SCNC: 24 MMOL/L (ref 22–29)
GFR SERPL CREATININE-BSD FRML MDRD: >90 ML/MIN/1.73M2
GLUCOSE SERPL-MCNC: 109 MG/DL (ref 70–99)
HDLC SERPL-MCNC: 76 MG/DL
LDLC SERPL CALC-MCNC: 159 MG/DL
NONHDLC SERPL-MCNC: 173 MG/DL
POTASSIUM SERPL-SCNC: 5 MMOL/L (ref 3.4–5.3)
PROT SERPL-MCNC: 7.7 G/DL (ref 6.4–8.3)
SODIUM SERPL-SCNC: 139 MMOL/L (ref 136–145)
TRIGL SERPL-MCNC: 72 MG/DL

## 2023-01-10 PROCEDURE — 99386 PREV VISIT NEW AGE 40-64: CPT | Mod: 25 | Performed by: INTERNAL MEDICINE

## 2023-01-10 PROCEDURE — 87389 HIV-1 AG W/HIV-1&-2 AB AG IA: CPT | Performed by: INTERNAL MEDICINE

## 2023-01-10 PROCEDURE — 0124A COVID-19 VACCINE BIVALENT BOOSTER 12+ (PFIZER): CPT | Performed by: INTERNAL MEDICINE

## 2023-01-10 PROCEDURE — 36415 COLL VENOUS BLD VENIPUNCTURE: CPT | Performed by: INTERNAL MEDICINE

## 2023-01-10 PROCEDURE — 80053 COMPREHEN METABOLIC PANEL: CPT | Performed by: INTERNAL MEDICINE

## 2023-01-10 PROCEDURE — 80061 LIPID PANEL: CPT | Performed by: INTERNAL MEDICINE

## 2023-01-10 PROCEDURE — 91312 COVID-19 VACCINE BIVALENT BOOSTER 12+ (PFIZER): CPT | Performed by: INTERNAL MEDICINE

## 2023-01-10 PROCEDURE — 90471 IMMUNIZATION ADMIN: CPT | Performed by: INTERNAL MEDICINE

## 2023-01-10 PROCEDURE — 86803 HEPATITIS C AB TEST: CPT | Performed by: INTERNAL MEDICINE

## 2023-01-10 PROCEDURE — 90686 IIV4 VACC NO PRSV 0.5 ML IM: CPT | Performed by: INTERNAL MEDICINE

## 2023-01-10 ASSESSMENT — ENCOUNTER SYMPTOMS
HEMATURIA: 0
JOINT SWELLING: 0
DIARRHEA: 0
PALPITATIONS: 0
HEADACHES: 0
NERVOUS/ANXIOUS: 0
FEVER: 0
SHORTNESS OF BREATH: 0
HEARTBURN: 0
CHILLS: 0
COUGH: 0
ARTHRALGIAS: 0
EYE PAIN: 0
SORE THROAT: 0
ABDOMINAL PAIN: 0
PARESTHESIAS: 0
MYALGIAS: 0
NAUSEA: 0
CONSTIPATION: 0
FREQUENCY: 0
DIZZINESS: 0
WEAKNESS: 0
DYSURIA: 0
HEMATOCHEZIA: 0

## 2023-01-10 ASSESSMENT — PAIN SCALES - GENERAL: PAINLEVEL: NO PAIN (0)

## 2023-01-10 NOTE — PROGRESS NOTES
Chief Complaint   Patient presents with     Physical     Establish Care     Imm/Inj     COVID-19 VACCINE     NEW to [facility]    SUBJECTIVE:   CC: Moshe is an 42 year old who presents for preventative health visit.     Patient has been advised of split billing requirements and indicates understanding: Yes  Healthy Habits:     Getting at least 3 servings of Calcium per day:  Yes    Bi-annual eye exam:  Yes    Dental care twice a year:  NO    Sleep apnea or symptoms of sleep apnea:  None    Diet:  Regular (no restrictions)    Frequency of exercise:  4-5 days/week    Duration of exercise:  45-60 minutes    Taking medications regularly:  Yes    Medication side effects:  Not applicable    PHQ-2 Total Score: 0    Additional concerns today:  Yes        Today's PHQ-2 Score:   PHQ-2 ( 1999 Pfizer) 1/9/2023   Q1: Little interest or pleasure in doing things 0   Q2: Feeling down, depressed or hopeless 0   PHQ-2 Score 0   Q1: Little interest or pleasure in doing things Not at all   Q2: Feeling down, depressed or hopeless Not at all   PHQ-2 Score 0       Have you ever done Advance Care Planning? (For example, a Health Directive, POLST, or a discussion with a medical provider or your loved ones about your wishes): Yes, patient states has an Advance Care Planning document and will bring a copy to the clinic.    Social History     Tobacco Use     Smoking status: Never     Smokeless tobacco: Never   Substance Use Topics     Alcohol use: Yes         Alcohol Use 1/9/2023   Prescreen: >3 drinks/day or >7 drinks/week? Yes   AUDIT SCORE  5       Last PSA: No results found for: PSA    Reviewed orders with patient. Reviewed health maintenance and updated orders accordingly - Yes  Labs reviewed in EPIC  BP Readings from Last 3 Encounters:   01/10/23 136/84   01/01/23 (!) 149/108   08/13/20 (!) 140/97    Wt Readings from Last 3 Encounters:   01/10/23 87.9 kg (193 lb 12.8 oz)   01/01/23 86.7 kg (191 lb 2.2 oz)   08/13/20 88.4 kg (194 lb  "12.8 oz)                  Patient Active Problem List   Diagnosis   (none) - all problems resolved or deleted     No past surgical history on file.    Social History     Tobacco Use     Smoking status: Never     Smokeless tobacco: Never   Substance Use Topics     Alcohol use: Yes     No family history on file.      Current Outpatient Medications   Medication Sig Dispense Refill     omeprazole (PRILOSEC) 20 MG DR capsule Take 20 mg by mouth daily       doxycycline hyclate (VIBRAMYCIN) 100 MG capsule Take 1 capsule (100 mg) by mouth 2 times daily 14 capsule 0     No Known Allergies  No lab results found.     Reviewed and updated as needed this visit by clinical staff   Tobacco  Allergies  Meds              Reviewed and updated as needed this visit by Provider                 No past medical history on file.   No past surgical history on file.    Review of Systems   Constitutional: Negative for chills and fever.   HENT: Negative for congestion, ear pain, hearing loss and sore throat.    Eyes: Negative for pain and visual disturbance.   Respiratory: Negative for cough and shortness of breath.    Cardiovascular: Negative for chest pain, palpitations and peripheral edema.   Gastrointestinal: Negative for abdominal pain, constipation, diarrhea, heartburn, hematochezia and nausea.   Genitourinary: Negative for dysuria, frequency, genital sores, hematuria, impotence, penile discharge and urgency.   Musculoskeletal: Negative for arthralgias, joint swelling and myalgias.   Skin: Negative for rash.   Neurological: Negative for dizziness, weakness, headaches and paresthesias.   Psychiatric/Behavioral: Negative for mood changes. The patient is not nervous/anxious.          OBJECTIVE:   /84   Pulse 82   Temp 98  F (36.7  C) (Oral)   Resp 16   Ht 1.867 m (6' 1.5\")   Wt 87.9 kg (193 lb 12.8 oz)   SpO2 100%   BMI 25.22 kg/m      Physical Exam  GENERAL: healthy, alert and no distress  EYES: Eyes grossly normal to " inspection  HENT: ear canals and TM's normal, nose and mouth without ulcers or lesions  NECK: no adenopathy, no asymmetry, masses, or scars and thyroid normal to palpation  RESP: lungs clear to auscultation - no rales, rhonchi or wheezes  CV: regular rate and rhythm, normal S1 S2, no S3 or S4, no murmur, click or rub, no peripheral edema and peripheral pulses strong  ABDOMEN: soft, nontender, no hepatosplenomegaly, no masses and bowel sounds normal  MS: no gross musculoskeletal defects noted, no edema  NEURO: Normal strength and tone, sensory exam grossly normal, mentation intact, gait normal including heel/toe/tandem walking and Romberg normal  PSYCH: mentation appears normal, affect normal/bright    Diagnostic Test Results:  Labs reviewed in Epic            ASSESSMENT/PLAN:   (Z00.00) Routine general medical examination at a health care facility  (primary encounter diagnosis)  Comment: HEALTH CARE MAINTENANCE reviewed; reviewed use of PPI; cutting back on daily drinking should lessen need for PPI; best to try 2 week treatment for3-4 times per year. If not feasible, then consider EGD.   Plan: screening discussed, including daily ETOH use and periodic BLOOD PRESSURE monitoring. No meds recommended    (Z11.4) Screening for HIV (human immunodeficiency virus)  Comment: screening  Plan: HIV Antigen Antibody Combo          (Z11.59) Need for hepatitis C screening test  Comment: screening  Plan: Hepatitis C Screen Reflex to HCV RNA Quant and Genotype          (Z13.220) Screening for hyperlipidemia  Comment: screening  Plan: Lipid panel reflex to direct LDL Non-fasting,         Comprehensive metabolic panel (BMP + Alb, Alk         Phos, ALT, AST, Total. Bili, TP)          (Z23) High priority for 2019-nCoV vaccine  Comment: screening  Plan: COVID-19,PF,PFIZER BOOSTER BIVALENT 12+Yrs          (Z23) Encounter for immunization  Comment: screening  Plan: INFLUENZA VACCINE IM > 6 MONTHS VALENT IIV4         (AFLURIA/FLUZONE),  "COVID-19,PF,PFIZER BOOSTER         BIVALENT 12+Yrs            Patient has been advised of split billing requirements and indicates understanding: Yes      COUNSELING:   Reviewed preventive health counseling, as reflected in patient instructions       Regular exercise       Healthy diet/nutrition       Immunizations    Vaccinated for: Covid-19 and Influenza            BMI:   Estimated body mass index is 25.22 kg/m  as calculated from the following:    Height as of this encounter: 1.867 m (6' 1.5\").    Weight as of this encounter: 87.9 kg (193 lb 12.8 oz).   Weight management plan: Discussed healthy diet and exercise guidelines      He reports that he has never smoked. He has never used smokeless tobacco.        Paulette Alvarez MD  Internal Medicine   [facility]  "

## 2023-01-10 NOTE — NURSING NOTE
Covid Pfizer bivalent booster and Flu shot given. See Immunization section for details.  Lisa Magill, CMA

## 2023-01-11 LAB
HCV AB SERPL QL IA: NONREACTIVE
HIV 1+2 AB+HIV1 P24 AG SERPL QL IA: NONREACTIVE

## 2023-01-17 ENCOUNTER — MYC MEDICAL ADVICE (OUTPATIENT)
Dept: FAMILY MEDICINE | Facility: CLINIC | Age: 43
End: 2023-01-17
Payer: COMMERCIAL

## 2023-03-20 ENCOUNTER — TELEPHONE (OUTPATIENT)
Dept: FAMILY MEDICINE | Facility: CLINIC | Age: 43
End: 2023-03-20
Payer: COMMERCIAL

## 2023-03-20 NOTE — TELEPHONE ENCOUNTER
Patient called requesting a new referral to be placed for the [facility] in [place]. The patient is an existing patient with them for lower back issues, but he has not been seen by them in over a year and needs an updated referral. Patient requests a call back with an update.    Patient Phone: [phone]  OK to leave a detailed message    Demetrice Capellan

## 2023-03-20 NOTE — TELEPHONE ENCOUNTER
Pt seen on one occasion but never addressed.  Recommend appt; could see same day provider ( LF) and referral can be assessed and addressed.

## 2023-03-24 ENCOUNTER — ANCILLARY PROCEDURE (OUTPATIENT)
Dept: GENERAL RADIOLOGY | Facility: CLINIC | Age: 43
End: 2023-03-24
Attending: NURSE PRACTITIONER
Payer: COMMERCIAL

## 2023-03-24 ENCOUNTER — NURSE TRIAGE (OUTPATIENT)
Dept: NURSING | Facility: CLINIC | Age: 43
End: 2023-03-24

## 2023-03-24 ENCOUNTER — OFFICE VISIT (OUTPATIENT)
Dept: FAMILY MEDICINE | Facility: CLINIC | Age: 43
End: 2023-03-24
Payer: COMMERCIAL

## 2023-03-24 VITALS
TEMPERATURE: 97.8 F | RESPIRATION RATE: 16 BRPM | WEIGHT: 192.4 LBS | HEART RATE: 92 BPM | BODY MASS INDEX: 25.5 KG/M2 | OXYGEN SATURATION: 100 % | DIASTOLIC BLOOD PRESSURE: 98 MMHG | SYSTOLIC BLOOD PRESSURE: 146 MMHG | HEIGHT: 73 IN

## 2023-03-24 DIAGNOSIS — M54.50 CHRONIC BILATERAL LOW BACK PAIN WITHOUT SCIATICA: Primary | ICD-10-CM

## 2023-03-24 DIAGNOSIS — G89.29 CHRONIC BILATERAL LOW BACK PAIN WITHOUT SCIATICA: ICD-10-CM

## 2023-03-24 DIAGNOSIS — M54.50 CHRONIC BILATERAL LOW BACK PAIN WITHOUT SCIATICA: ICD-10-CM

## 2023-03-24 DIAGNOSIS — G89.29 CHRONIC BILATERAL LOW BACK PAIN WITHOUT SCIATICA: Primary | ICD-10-CM

## 2023-03-24 PROCEDURE — 99203 OFFICE O/P NEW LOW 30 MIN: CPT | Performed by: NURSE PRACTITIONER

## 2023-03-24 PROCEDURE — 72100 X-RAY EXAM L-S SPINE 2/3 VWS: CPT | Mod: TC | Performed by: RADIOLOGY

## 2023-03-24 ASSESSMENT — PAIN SCALES - GENERAL: PAINLEVEL: MODERATE PAIN (5)

## 2023-03-24 NOTE — PROGRESS NOTES
Assessment & Plan     Chronic bilateral low back pain without sciatica  Chronic stable; pending xray results; encouraged RICE, ibuprofen/tylenol, stretching and strengthening exercises; placed referral for PT patient has had success with in the past. Discussed ortho referral in the future if needed.     - Physical Therapy Referral; Future  - XR Lumbar Spine 2/3 Views; Future      SANTOSH Julian CNP  [facility] KAROL [place] is a 42 year old, presenting for the following health issues:  Referral (PT) and Back Pain    Additional Questions 3/24/2023   Roomed by Lisa Magill, CMA   Accompanied by self     Patient Reported Additional Medications 3/24/2023   Patient reports taking the following new medications NONE     History of Present Illness       Back Pain:  He presents for follow up of back pain. Patient's back pain is a chronic problem.  Location of back pain:  Right lower back and left lower back  Description of back pain: dull ache  Back pain spreads: right buttocks and right thigh    Since patient first noticed back pain, pain is: always present, but gets better and worse  Does back pain interfere with his job:  No      He eats 2-3 servings of fruits and vegetables daily.He consumes 0 sweetened beverage(s) daily.He exercises with enough effort to increase his heart rate 30 to 60 minutes per day.  He exercises with enough effort to increase his heart rate 4 days per week.   He is taking medications regularly.       Pain History:  When did you first notice your pain? 15 years at least    Have you seen this provider for your pain in the past?   Yes   Where in your body do you have pain? Low back   Are you seeing anyone else for your pain? No    Chronic Pain Follow Up:    Location of pain: low back   Analgesia/pain control:    - Recent changes:  Always present, but more intense at times.     - Overall control: Tolerable with discomfort    - Current treatments: rehab exercises  "and stretches. Has done chiropractor, but has not helped.  Also had done PT in the past.    Adherence:     - Do you ever take more pain medicine than prescribed? No    - When did you take your last dose of pain medicine?  None recently    Adverse effects: n/a  PDMP Review     None        Last CSA Agreement:   CSA -- Patient Level:    CSA: None found at the patient level.         Chronic low back pain; started with chiropractor that did not help; went through PT and had some improvement but no resolution; would like to try PT again.     All day every day symptoms; some days are worse than others. Has the most discomfort when bending down or standing in one spot for too long of a time. Also notes if sitting or laying down becomes very stiff and hard to get moving again.     Review of Systems   Constitutional, HEENT, cardiovascular, pulmonary, gi and gu systems are negative, except as otherwise noted.      Objective    BP (!) 146/98 (BP Location: Right arm, Patient Position: Sitting, Cuff Size: Adult Large)   Pulse 92   Temp 97.8  F (36.6  C) (Oral)   Resp 16   Ht 1.854 m (6' 1\")   Wt 87.3 kg (192 lb 6.4 oz)   SpO2 100%   BMI 25.38 kg/m    Body mass index is 25.38 kg/m .  Physical Exam   GENERAL: healthy, alert and no distress  RESP: lungs clear to auscultation - no rales, rhonchi or wheezes  CV: regular rate and rhythm, normal S1 S2, no S3 or S4, no murmur, click or rub, no peripheral edema and peripheral pulses strong  MS: no gross musculoskeletal defects noted, no edema              "

## 2023-03-24 NOTE — TELEPHONE ENCOUNTER
Moshe calls and says that he had an appointment today and saw a Dr. Pt. Says that he has lower back pain. Pt. Says that he is at home and cannot sit or stand without lots of pain. Pt. Says that he has pain going down his right leg, too. RN checked Epic and read to pt.  The Dr's suggestions for pt., from pt's appointment today. Pt. Says that Ibuprofen/ Tylenol don't help his pain. Pt. Refuses to go to the ER. RN told pt. That the on-call Drs. Will not order pain medications after hours. Pt. Voiced understanding and says that he is not going to see a Dr. And declined paging the Dr. COVID 19 Nurse Triage Plan/Patient Instructions    Please be aware that novel coronavirus (COVID-19) may be circulating in the community. If you develop symptoms such as fever, cough, or SOB or if you have concerns about the presence of another infection including coronavirus (COVID-19), please contact your health care provider or visit [email or web address].     Disposition/Instructions    ED Visit recommended. Follow protocol based instructions.     Bring Your Own Device:  Please also bring your smart device(s) (smart phones, tablets, laptops) and their charging cables for your personal use and to communicate with your care team during your visit.    Thank you for taking steps to prevent the spread of this virus.  o Limit your contact with others.  o Wear a simple mask to cover your cough.  o Wash your hands well and often.    Resources    [facility]: About COVID-19: [email or web address]    CDC: What to Do If You're Sick: www.cdc.gov/coronavirus/2019-ncov/about/steps-when-sick.html    CDC: Ending Home Isolation: www.cdc.gov/coronavirus/2019-ncov/hcp/disposition-in-home-patients.html     CDC: Caring for Someone: www.cdc.gov/coronavirus/2019-ncov/if-you-are-sick/care-for-someone.html     MD: Interim Guidance for Hospital Discharge to Home:  [email or web address]/[email or web address]/hcp/hospdischarge.pdf    [facility] clinical trials (COVID-19 research studies): [email or web address]     Below are the COVID-19 hotlines at the [facility] of Health ([facility]). Interpreters are available.   o For health questions: Call 508-042-8004 or 1-347.729.6248 (7 a.m. to 7 p.m.)  o For questions about schools and childcare: Call 655-761-6928 or 1-227.536.6021 (7 a.m. to 7 p.m.)                     Reason for Disposition    Weakness of a leg or foot (e.g., unable to bear weight, dragging foot)    Additional Information    Negative: Passed out (i.e., lost consciousness, collapsed and was not responding)    Negative: Shock suspected (e.g., cold/pale/clammy skin, too weak to stand, low BP, rapid pulse)    Negative: Sounds like a life-threatening emergency to the triager    Negative: Major injury to the back (e.g., MVA, fall > 10 feet or 3 meters, penetrating injury, etc.)    Negative: Followed a tailbone injury    Negative: [1] Pain in the upper back over the ribs (rib cage) AND [2] radiates (travels, goes) into chest    Negative: [1] Pain in the upper back over the ribs (rib cage) AND [2] worsened by coughing (or clearly increases with breathing)    Negative: Back pain during pregnancy    Negative: Pain mainly in flank (i.e., in the side, over the lower ribs or just below the ribs)    Negative: [1] SEVERE back pain (e.g., excruciating) AND [2] sudden onset AND [3] age > 60 years    Negative: [1] Unable to urinate (or only a few drops) > 4 hours AND [2] bladder feels very full (e.g., palpable bladder or strong urge to urinate)    Negative: [1] Loss of bladder or bowel control (urine or bowel incontinence; wetting self, leaking stool) AND [2] new-onset    Negative: Numbness in groin or rectal area (i.e., loss of sensation)    Negative: [1] SEVERE abdominal pain AND [2] present > 1 hour    Negative: [1] Abdominal pain AND [2] age >  60 years    Protocols used: BACK PAIN-A-AH

## 2023-03-24 NOTE — NURSING NOTE
"Chief Complaint   Patient presents with     Referral     PT     Back Pain     Initial BP (!) 146/98 (BP Location: Right arm, Patient Position: Sitting, Cuff Size: Adult Large)   Pulse 92   Temp 97.8  F (36.6  C) (Oral)   Resp 16   Ht 1.854 m (6' 1\")   Wt 87.3 kg (192 lb 6.4 oz)   SpO2 100%   BMI 25.38 kg/m   Estimated body mass index is 25.38 kg/m  as calculated from the following:    Height as of this encounter: 1.854 m (6' 1\").    Weight as of this encounter: 87.3 kg (192 lb 6.4 oz).  BP completed using cuff size large right arm    Lisa Magill, CMA    "

## 2023-03-27 ASSESSMENT — ANXIETY QUESTIONNAIRES
GAD7 TOTAL SCORE: 0
IF YOU CHECKED OFF ANY PROBLEMS ON THIS QUESTIONNAIRE, HOW DIFFICULT HAVE THESE PROBLEMS MADE IT FOR YOU TO DO YOUR WORK, TAKE CARE OF THINGS AT HOME, OR GET ALONG WITH OTHER PEOPLE: NOT DIFFICULT AT ALL
5. BEING SO RESTLESS THAT IT IS HARD TO SIT STILL: NOT AT ALL
3. WORRYING TOO MUCH ABOUT DIFFERENT THINGS: NOT AT ALL
7. FEELING AFRAID AS IF SOMETHING AWFUL MIGHT HAPPEN: NOT AT ALL
6. BECOMING EASILY ANNOYED OR IRRITABLE: NOT AT ALL
GAD7 TOTAL SCORE: 0
1. FEELING NERVOUS, ANXIOUS, OR ON EDGE: NOT AT ALL
2. NOT BEING ABLE TO STOP OR CONTROL WORRYING: NOT AT ALL

## 2023-03-27 ASSESSMENT — PATIENT HEALTH QUESTIONNAIRE - PHQ9
SUM OF ALL RESPONSES TO PHQ QUESTIONS 1-9: 0
5. POOR APPETITE OR OVEREATING: NOT AT ALL

## 2023-03-28 ENCOUNTER — THERAPY VISIT (OUTPATIENT)
Dept: PHYSICAL THERAPY | Facility: CLINIC | Age: 43
End: 2023-03-28
Attending: NURSE PRACTITIONER
Payer: COMMERCIAL

## 2023-03-28 DIAGNOSIS — M54.16 LUMBAR RADICULOPATHY: ICD-10-CM

## 2023-03-28 DIAGNOSIS — M54.50 CHRONIC BILATERAL LOW BACK PAIN WITHOUT SCIATICA: ICD-10-CM

## 2023-03-28 DIAGNOSIS — G89.29 CHRONIC BILATERAL LOW BACK PAIN WITHOUT SCIATICA: ICD-10-CM

## 2023-03-28 PROCEDURE — 97110 THERAPEUTIC EXERCISES: CPT | Mod: GP | Performed by: PHYSICAL THERAPIST

## 2023-03-28 PROCEDURE — 97162 PT EVAL MOD COMPLEX 30 MIN: CPT | Mod: GP | Performed by: PHYSICAL THERAPIST

## 2023-03-28 PROCEDURE — 97530 THERAPEUTIC ACTIVITIES: CPT | Mod: GP | Performed by: PHYSICAL THERAPIST

## 2023-03-29 DIAGNOSIS — G89.29 CHRONIC BILATERAL LOW BACK PAIN WITHOUT SCIATICA: Primary | ICD-10-CM

## 2023-03-29 DIAGNOSIS — G89.29 CHRONIC BILATERAL LOW BACK PAIN WITH RIGHT-SIDED SCIATICA: ICD-10-CM

## 2023-03-29 DIAGNOSIS — M54.50 CHRONIC BILATERAL LOW BACK PAIN WITHOUT SCIATICA: Primary | ICD-10-CM

## 2023-03-29 DIAGNOSIS — M54.41 CHRONIC BILATERAL LOW BACK PAIN WITH RIGHT-SIDED SCIATICA: ICD-10-CM

## 2023-03-29 PROBLEM — M54.16 LUMBAR RADICULOPATHY: Status: ACTIVE | Noted: 2023-03-29

## 2023-04-03 ENCOUNTER — TRANSFERRED RECORDS (OUTPATIENT)
Dept: HEALTH INFORMATION MANAGEMENT | Facility: CLINIC | Age: 43
End: 2023-04-03
Payer: COMMERCIAL

## 2023-04-07 ENCOUNTER — TRANSFERRED RECORDS (OUTPATIENT)
Dept: HEALTH INFORMATION MANAGEMENT | Facility: CLINIC | Age: 43
End: 2023-04-07

## 2023-04-12 ENCOUNTER — OFFICE VISIT (OUTPATIENT)
Dept: FAMILY MEDICINE | Facility: CLINIC | Age: 43
End: 2023-04-12
Payer: COMMERCIAL

## 2023-04-12 VITALS
RESPIRATION RATE: 13 BRPM | HEART RATE: 86 BPM | SYSTOLIC BLOOD PRESSURE: 138 MMHG | TEMPERATURE: 98.2 F | OXYGEN SATURATION: 100 % | HEIGHT: 73 IN | WEIGHT: 188 LBS | BODY MASS INDEX: 24.92 KG/M2 | DIASTOLIC BLOOD PRESSURE: 97 MMHG

## 2023-04-12 DIAGNOSIS — R03.0 ELEVATED BLOOD PRESSURE READING WITHOUT DIAGNOSIS OF HYPERTENSION: ICD-10-CM

## 2023-04-12 DIAGNOSIS — M51.369 DDD (DEGENERATIVE DISC DISEASE), LUMBAR: ICD-10-CM

## 2023-04-12 DIAGNOSIS — Z01.818 PREOP GENERAL PHYSICAL EXAM: Primary | ICD-10-CM

## 2023-04-12 LAB
ANION GAP SERPL CALCULATED.3IONS-SCNC: 8 MMOL/L (ref 7–15)
BUN SERPL-MCNC: 8.8 MG/DL (ref 6–20)
CALCIUM SERPL-MCNC: 9.5 MG/DL (ref 8.6–10)
CHLORIDE SERPL-SCNC: 106 MMOL/L (ref 98–107)
CREAT SERPL-MCNC: 1.01 MG/DL (ref 0.67–1.17)
DEPRECATED HCO3 PLAS-SCNC: 28 MMOL/L (ref 22–29)
ERYTHROCYTE [DISTWIDTH] IN BLOOD BY AUTOMATED COUNT: 12 % (ref 10–15)
GFR SERPL CREATININE-BSD FRML MDRD: >90 ML/MIN/1.73M2
GLUCOSE SERPL-MCNC: 99 MG/DL (ref 70–99)
HCT VFR BLD AUTO: 43.1 % (ref 40–53)
HGB BLD-MCNC: 15.8 G/DL (ref 13.3–17.7)
MCH RBC QN AUTO: 33.7 PG (ref 26.5–33)
MCHC RBC AUTO-ENTMCNC: 36.7 G/DL (ref 31.5–36.5)
MCV RBC AUTO: 92 FL (ref 78–100)
PLATELET # BLD AUTO: 248 10E3/UL (ref 150–450)
POTASSIUM SERPL-SCNC: 5 MMOL/L (ref 3.4–5.3)
RBC # BLD AUTO: 4.69 10E6/UL (ref 4.4–5.9)
SODIUM SERPL-SCNC: 142 MMOL/L (ref 136–145)
WBC # BLD AUTO: 7.7 10E3/UL (ref 4–11)

## 2023-04-12 PROCEDURE — 80048 BASIC METABOLIC PNL TOTAL CA: CPT | Performed by: FAMILY MEDICINE

## 2023-04-12 PROCEDURE — 36415 COLL VENOUS BLD VENIPUNCTURE: CPT | Performed by: FAMILY MEDICINE

## 2023-04-12 PROCEDURE — 99214 OFFICE O/P EST MOD 30 MIN: CPT | Performed by: FAMILY MEDICINE

## 2023-04-12 PROCEDURE — 85027 COMPLETE CBC AUTOMATED: CPT | Performed by: FAMILY MEDICINE

## 2023-04-12 ASSESSMENT — PAIN SCALES - GENERAL: PAINLEVEL: NO PAIN (0)

## 2023-04-12 NOTE — PROGRESS NOTES
[facility] [address] [address]  Phone: [phone]  Primary Provider: Dougie - [facility]  Pre-op Performing Provider: JOSE PERAZA      PREOPERATIVE EVALUATION:  Today's date: 4/12/2023    Moshe Reese is a 42 year old male who presents for a preoperative evaluation.      4/12/2023     8:25 AM   Additional Questions   Roomed by Shiloh CLIFTON     Surgical Information:  Surgery/Procedure:Lumbar spine Herniated disc  Surgery Location: [facility]  Surgeon: Dr. Zhu  Surgery Date: 4/13/23  Time of Surgery: TBD  Where patient plans to recover: At home with family  Fax number for surgical facility: [phone]    Assessment & Plan     The proposed surgical procedure is considered INTERMEDIATE risk.    Preop general physical exam  Labs are reviewed and are normal  - Basic metabolic panel; Future  - CBC with platelets; Future  - Basic metabolic panel  - CBC with platelets    DDD (degenerative disc disease), lumbar  Patient is not currently using any NSAIDs.    Elevated blood pressure reading without diagnosis of hypertension    Patient has had elevated blood pressure readings on several occasions previously.  He tells that he gets anxious coming into doctors.  Recently met with PCP and was asked to monitor blood pressures.  Today blood pressure is elevated as well.  I discussed about lowering his alcohol consumption.  Avoid salt intake in diet.  Get a blood pressure monitor for home use.  Contact PCP to schedule a follow-up visit after surgery for recheck on blood pressure and consider starting medications if it continues to be high.             RECOMMENDATION:  APPROVAL GIVEN to proceed with proposed procedure, without further diagnostic evaluation.            Subjective     HPI related to upcoming procedure:         4/11/2023     9:49 AM   Preop Questions   1. Have you ever had a heart attack or stroke? No   2. Have you ever had surgery on your  heart or blood vessels, such as a stent placement, a coronary artery bypass, or surgery on an artery in your head, neck, heart, or legs? No   3. Do you have chest pain with activity? No   4. Do you have a history of  heart failure? No   5. Do you currently have a cold, bronchitis or symptoms of other infection? No   6. Do you have a cough, shortness of breath, or wheezing? No   7. Do you or anyone in your family have previous history of blood clots? No   8. Do you or does anyone in your family have a serious bleeding problem such as prolonged bleeding following surgeries or cuts? No   9. Have you ever had problems with anemia or been told to take iron pills? No   10. Have you had any abnormal blood loss such as black, tarry or bloody stools? No   11. Have you ever had a blood transfusion? No   12. Are you willing to have a blood transfusion if it is medically needed before, during, or after your surgery? Yes   13. Have you or any of your relatives ever had problems with anesthesia? No   14. Do you have sleep apnea, excessive snoring or daytime drowsiness? No   15. Do you have any artifical heart valves or other implanted medical devices like a pacemaker, defibrillator, or continuous glucose monitor? No   16. Do you have artificial joints? No   17. Are you allergic to latex? No       Health Care Directive:  Patient does not have a Health Care Directive or Living Will:     Preoperative Review of :            Review of Systems  CONSTITUTIONAL: NEGATIVE for fever, chills, change in weight  INTEGUMENTARY/SKIN: NEGATIVE for worrisome rashes, moles or lesions  EYES: NEGATIVE for vision changes or irritation  ENT/MOUTH: NEGATIVE for ear, mouth and throat problems  RESP: NEGATIVE for significant cough or SOB  CV: NEGATIVE for chest pain, palpitations or peripheral edema  GI: NEGATIVE for nausea, abdominal pain, heartburn, or change in bowel habits  : NEGATIVE for frequency, dysuria, or hematuria  MUSCULOSKELETAL:  "NEGATIVE for significant arthralgias or myalgia  NEURO: NEGATIVE for weakness, dizziness or paresthesias  ENDOCRINE: NEGATIVE for temperature intolerance, skin/hair changes  HEME: NEGATIVE for bleeding problems  PSYCHIATRIC: NEGATIVE for changes in mood or affect    Patient Active Problem List    Diagnosis Date Noted     Lumbar radiculopathy 03/29/2023     Priority: Medium      History reviewed. No pertinent past medical history.  History reviewed. No pertinent surgical history.  Current Outpatient Medications   Medication Sig Dispense Refill     omeprazole (PRILOSEC) 20 MG DR capsule Take 20 mg by mouth daily         No Known Allergies     Social History     Tobacco Use     Smoking status: Never     Smokeless tobacco: Never   Vaping Use     Vaping status: Never Used   Substance Use Topics     Alcohol use: Yes       History   Drug Use Unknown         Objective     BP (!) 138/97   Pulse 86   Temp 98.2  F (36.8  C) (Temporal)   Resp 13   Ht 1.862 m (6' 1.31\")   Wt 85.3 kg (188 lb)   SpO2 100%   BMI 24.60 kg/m      Physical Exam    GENERAL APPEARANCE: healthy, alert and no distress     EYES: EOMI,  PERRL     HENT: ear canals and TM's normal and nose and mouth without ulcers or lesions     NECK: no adenopathy, no asymmetry, masses, or scars and thyroid normal to palpation     RESP: lungs clear to auscultation - no rales, rhonchi or wheezes     CV: regular rates and rhythm, normal S1 S2, no S3 or S4 and no murmur, click or rub     ABDOMEN:  soft, nontender, no HSM or masses and bowel sounds normal     MS: extremities normal- no gross deformities noted, no evidence of inflammation in joints, FROM in all extremities.     SKIN: no suspicious lesions or rashes     NEURO: Normal strength and tone, sensory exam grossly normal, mentation intact and speech normal     PSYCH: mentation appears normal. and affect normal/bright     LYMPHATICS: No cervical adenopathy      Diagnostics:     Results for orders placed or performed " in visit on 04/12/23   Basic metabolic panel     Status: Normal   Result Value Ref Range    Sodium 142 136 - 145 mmol/L    Potassium 5.0 3.4 - 5.3 mmol/L    Chloride 106 98 - 107 mmol/L    Carbon Dioxide (CO2) 28 22 - 29 mmol/L    Anion Gap 8 7 - 15 mmol/L    Urea Nitrogen 8.8 6.0 - 20.0 mg/dL    Creatinine 1.01 0.67 - 1.17 mg/dL    Calcium 9.5 8.6 - 10.0 mg/dL    Glucose 99 70 - 99 mg/dL    GFR Estimate >90 >60 mL/min/1.73m2   CBC with platelets     Status: Abnormal   Result Value Ref Range    WBC Count 7.7 4.0 - 11.0 10e3/uL    RBC Count 4.69 4.40 - 5.90 10e6/uL    Hemoglobin 15.8 13.3 - 17.7 g/dL    Hematocrit 43.1 40.0 - 53.0 %    MCV 92 78 - 100 fL    MCH 33.7 (H) 26.5 - 33.0 pg    MCHC 36.7 (H) 31.5 - 36.5 g/dL    RDW 12.0 10.0 - 15.0 %    Platelet Count 248 150 - 450 10e3/uL         Revised Cardiac Risk Index (RCRI):  The patient has the following serious cardiovascular risks for perioperative complications:   - No serious cardiac risks = 0 points     RCRI Interpretation: 0 points: Class I (very low risk - 0.4% complication rate)           Signed Electronically by: Jordan Fischer MD  Copy of this evaluation report is provided to requesting physician.

## 2023-04-12 NOTE — PROGRESS NOTES
[facility] [address] [address]  Phone: [phone]  Primary Provider: [facility]  Pre-op Performing Provider: JOSE PERAZA    {Provider  Link to PREOP SmartSet  Use this to apply standard patient instructions to AVS; includes medication directions, common orders, guidelines for anemia, warfarin, additional testing   :160903}  PREOPERATIVE EVALUATION:  Today's date: 4/12/2023    Moshe Reese is a 42 year old male who presents for a preoperative evaluation.      3/24/2023    12:51 PM   Additional Questions   Roomed by Lisa Magill, CMA   Accompanied by self     Surgical Information:  Surgery/Procedure: Herniated disc  [facility]  Surgeon: Dr. Zhu  Surgery Date: 4/13/23  Time of Surgery: TBD  Where patient plans to recover: At home with family  Fax number for surgical facility: {SURGERY FAX NUMBER:[phone]}    {2021 Provider Charting Preference for Preop :318578}    Subjective     HPI related to upcoming procedure: ***        4/11/2023     9:49 AM   Preop Questions   1. Have you ever had a heart attack or stroke? No   2. Have you ever had surgery on your heart or blood vessels, such as a stent placement, a coronary artery bypass, or surgery on an artery in your head, neck, heart, or legs? No   3. Do you have chest pain with activity? No   4. Do you have a history of  heart failure? No   5. Do you currently have a cold, bronchitis or symptoms of other infection? No   6. Do you have a cough, shortness of breath, or wheezing? No   7. Do you or anyone in your family have previous history of blood clots? No   8. Do you or does anyone in your family have a serious bleeding problem such as prolonged bleeding following surgeries or cuts? No   9. Have you ever had problems with anemia or been told to take iron pills? No   10. Have you had any abnormal blood loss such as black, tarry or bloody stools? No   11. Have you  "ever had a blood transfusion? No   12. Are you willing to have a blood transfusion if it is medically needed before, during, or after your surgery? Yes   13. Have you or any of your relatives ever had problems with anesthesia? No   14. Do you have sleep apnea, excessive snoring or daytime drowsiness? No   15. Do you have any artifical heart valves or other implanted medical devices like a pacemaker, defibrillator, or continuous glucose monitor? No   16. Do you have artificial joints? No   17. Are you allergic to latex? No       Health Care Directive:  Patient does not have a Health Care Directive or Living Will: {ADVANCE_DIRECTIVE_STATUS:770650}    Preoperative Review of :  {Mnpmpreport:704947}  {Review MNPMP for all patients per ICSI MNPMP Profile:885560}    {Chronic problem details (Optional) :194828}    Review of Systems  {ROS Preop Choices:598539}    Patient Active Problem List    Diagnosis Date Noted     Lumbar radiculopathy 2023     Priority: Medium      No past medical history on file.  No past surgical history on file.  Current Outpatient Medications   Medication Sig Dispense Refill     omeprazole (PRILOSEC) 20 MG DR capsule Take 20 mg by mouth daily         No Known Allergies     Social History     Tobacco Use     Smoking status: Never     Smokeless tobacco: Never   Vaping Use     Vaping status: Never Used   Substance Use Topics     Alcohol use: Yes     {FAMILY HISTORY (Optional):709482241}  History   Drug Use Unknown         Objective     BP (!) 138/97   Pulse 86   Temp 98.2  F (36.8  C) (Temporal)   Resp 13   Ht 1.862 m (6' 1.31\")   Wt 85.3 kg (188 lb)   SpO2 100%   BMI 24.60 kg/m      Physical Exam  {EXAM Preop Choices:081341}    Recent Labs   Lab Test 01/10/23  1041      POTASSIUM 5.0   CR 0.85        Diagnostics:  {LABS:618948}   {EK}    Revised Cardiac Risk Index (RCRI):  The patient has the following serious cardiovascular risks for perioperative complications:  {PREOP " "REVISED CARDIAC RISK INDEX (RCRI) :776113::\" - No serious cardiac risks = 0 points\"}     RCRI Interpretation: {REVISED CARDIAC RISK INTERPRETATION :659508}         Signed Electronically by: Jordan Fischer MD  Copy of this evaluation report is provided to requesting physician.    {Provider Resources  [facility]  Revised Cardiac Risk Index :409800}  "

## 2023-04-28 ENCOUNTER — TRANSFERRED RECORDS (OUTPATIENT)
Dept: HEALTH INFORMATION MANAGEMENT | Facility: CLINIC | Age: 43
End: 2023-04-28
Payer: COMMERCIAL

## 2023-05-15 PROBLEM — M54.16 LUMBAR RADICULOPATHY: Status: RESOLVED | Noted: 2023-03-29 | Resolved: 2023-05-15

## 2023-07-17 ENCOUNTER — LAB REQUISITION (OUTPATIENT)
Dept: LAB | Facility: CLINIC | Age: 43
End: 2023-07-17
Payer: COMMERCIAL

## 2023-07-17 DIAGNOSIS — K61.0 ANAL ABSCESS: ICD-10-CM

## 2023-07-17 PROCEDURE — 87077 CULTURE AEROBIC IDENTIFY: CPT | Mod: ORL | Performed by: COLON & RECTAL SURGERY

## 2023-07-17 PROCEDURE — 87075 CULTR BACTERIA EXCEPT BLOOD: CPT | Mod: ORL | Performed by: COLON & RECTAL SURGERY

## 2023-07-17 PROCEDURE — 87070 CULTURE OTHR SPECIMN AEROBIC: CPT | Mod: ORL | Performed by: COLON & RECTAL SURGERY

## 2023-07-20 LAB — BACTERIA ABSC ANAEROBE+AEROBE CULT: ABNORMAL

## 2023-07-22 LAB
BACTERIA ABSC ANAEROBE+AEROBE CULT: ABNORMAL
BACTERIA ABSC ANAEROBE+AEROBE CULT: ABNORMAL

## 2024-01-04 ENCOUNTER — PATIENT OUTREACH (OUTPATIENT)
Dept: CARE COORDINATION | Facility: CLINIC | Age: 44
End: 2024-01-04
Payer: COMMERCIAL

## 2024-01-18 ENCOUNTER — PATIENT OUTREACH (OUTPATIENT)
Dept: CARE COORDINATION | Facility: CLINIC | Age: 44
End: 2024-01-18
Payer: COMMERCIAL

## 2024-04-13 ENCOUNTER — HEALTH MAINTENANCE LETTER (OUTPATIENT)
Age: 44
End: 2024-04-13

## 2024-07-08 ENCOUNTER — PATIENT OUTREACH (OUTPATIENT)
Dept: CARE COORDINATION | Facility: CLINIC | Age: 44
End: 2024-07-08
Payer: COMMERCIAL

## 2025-04-08 ENCOUNTER — APPOINTMENT (OUTPATIENT)
Dept: URBAN - METROPOLITAN AREA CLINIC 256 | Age: 45
Setting detail: DERMATOLOGY
End: 2025-04-08

## 2025-04-08 VITALS — HEIGHT: 73 IN | WEIGHT: 185 LBS

## 2025-04-08 DIAGNOSIS — B07.0 PLANTAR WART: ICD-10-CM

## 2025-04-08 PROCEDURE — OTHER MIPS QUALITY: OTHER

## 2025-04-08 PROCEDURE — OTHER PHOTO-DOCUMENTATION: OTHER

## 2025-04-08 PROCEDURE — OTHER COUNSELING: OTHER

## 2025-04-08 PROCEDURE — OTHER PATIENT SPECIFIC COUNSELING: OTHER

## 2025-04-08 ASSESSMENT — LOCATION DETAILED DESCRIPTION DERM: LOCATION DETAILED: LEFT PLANTAR FOREFOOT OVERLYING 3RD METATARSAL

## 2025-04-08 ASSESSMENT — LOCATION ZONE DERM: LOCATION ZONE: FEET

## 2025-04-08 ASSESSMENT — LOCATION SIMPLE DESCRIPTION DERM: LOCATION SIMPLE: LEFT PLANTAR SURFACE

## 2025-04-08 NOTE — PROCEDURE: PATIENT SPECIFIC COUNSELING
Detail Level: Zone
- Instructed patient to initiate treatment given by Urgent Care: Prednisone and Keflex and can take Tylenol for pain as needed.\\n- Advised not to treat Warts today due to inflammation.\\n- Discussed Wart treatment options in detail with patient, he will return to clinic in 1 month for treatment.\\n- Instructed patient to keep his foot elevated until inflammation lessens.\\n- Discussed symptoms of infection and patient will watch for this area worsening.

## 2025-04-08 NOTE — PROCEDURE: PHOTO-DOCUMENTATION
Photo Preface (Leave Blank If You Do Not Want): Photographs were obtained today
Detail Level: Zone
Details (Free Text): left foot

## 2025-04-08 NOTE — HPI: EVALUATION OF SKIN LESION(S)
How Severe Are Your Spot(S)?: severe
Hpi Title: Evaluation of Skin Lesions
Additional History: Patient states that he has had what he thinks are warts for a while on the left foot and he tried using OTC Wart treatment the other day, but then the whole foot started hurting. Patient states that he was seen in urgent care this morning because he could barely walk, they prescribed him pain meds and an antibiotic that he has not started yet and recommended he come here for treatment of the warts. He states that they recommended an injection for the warts rather than freezing.
Family Member: Mother

## 2025-04-19 ENCOUNTER — HEALTH MAINTENANCE LETTER (OUTPATIENT)
Age: 45
End: 2025-04-19

## 2025-05-27 ENCOUNTER — APPOINTMENT (OUTPATIENT)
Dept: URBAN - METROPOLITAN AREA CLINIC 256 | Age: 45
Setting detail: DERMATOLOGY
End: 2025-05-27

## 2025-05-27 DIAGNOSIS — B07.0 PLANTAR WART: ICD-10-CM

## 2025-05-27 DIAGNOSIS — L74.51 PRIMARY FOCAL HYPERHIDROSIS: ICD-10-CM

## 2025-05-27 PROBLEM — L74.510 PRIMARY FOCAL HYPERHIDROSIS, AXILLA: Status: ACTIVE | Noted: 2025-05-27

## 2025-05-27 PROCEDURE — 99213 OFFICE O/P EST LOW 20 MIN: CPT | Mod: 25

## 2025-05-27 PROCEDURE — OTHER PATIENT SPECIFIC COUNSELING: OTHER

## 2025-05-27 PROCEDURE — 17110 DESTRUCT B9 LESION 1-14: CPT

## 2025-05-27 PROCEDURE — OTHER BENIGN DESTRUCTION: OTHER

## 2025-05-27 PROCEDURE — OTHER MIPS QUALITY: OTHER

## 2025-05-27 PROCEDURE — OTHER COUNSELING: OTHER

## 2025-05-27 ASSESSMENT — LOCATION DETAILED DESCRIPTION DERM
LOCATION DETAILED: LEFT PLANTAR FOREFOOT OVERLYING 2ND METATARSAL
LOCATION DETAILED: LEFT AXILLARY VAULT
LOCATION DETAILED: LEFT PLANTAR FOREFOOT OVERLYING 4TH METATARSAL
LOCATION DETAILED: LEFT MEDIAL PLANTAR HEEL
LOCATION DETAILED: RIGHT AXILLARY VAULT
LOCATION DETAILED: LEFT LATERAL PLANTAR MIDFOOT

## 2025-05-27 ASSESSMENT — LOCATION ZONE DERM
LOCATION ZONE: FEET
LOCATION ZONE: AXILLAE

## 2025-05-27 ASSESSMENT — LOCATION SIMPLE DESCRIPTION DERM
LOCATION SIMPLE: LEFT AXILLARY VAULT
LOCATION SIMPLE: RIGHT AXILLARY VAULT
LOCATION SIMPLE: LEFT PLANTAR SURFACE

## (undated) RX ORDER — LIDOCAINE HYDROCHLORIDE 10 MG/ML
INJECTION, SOLUTION EPIDURAL; INFILTRATION; INTRACAUDAL; PERINEURAL
Status: DISPENSED
Start: 2023-01-01